# Patient Record
Sex: MALE | Race: ASIAN | NOT HISPANIC OR LATINO | Employment: STUDENT | URBAN - METROPOLITAN AREA
[De-identification: names, ages, dates, MRNs, and addresses within clinical notes are randomized per-mention and may not be internally consistent; named-entity substitution may affect disease eponyms.]

---

## 2019-05-15 ENCOUNTER — OFFICE VISIT (OUTPATIENT)
Dept: FAMILY MEDICINE CLINIC | Facility: CLINIC | Age: 24
End: 2019-05-15
Payer: COMMERCIAL

## 2019-05-15 VITALS
TEMPERATURE: 97.9 F | WEIGHT: 125 LBS | HEART RATE: 80 BPM | RESPIRATION RATE: 16 BRPM | BODY MASS INDEX: 21.34 KG/M2 | DIASTOLIC BLOOD PRESSURE: 70 MMHG | HEIGHT: 64 IN | SYSTOLIC BLOOD PRESSURE: 104 MMHG

## 2019-05-15 DIAGNOSIS — J30.9 ALLERGIC RHINITIS, UNSPECIFIED SEASONALITY, UNSPECIFIED TRIGGER: ICD-10-CM

## 2019-05-15 DIAGNOSIS — Z13.83 SCREENING FOR CARDIOVASCULAR, RESPIRATORY, AND GENITOURINARY DISEASES: ICD-10-CM

## 2019-05-15 DIAGNOSIS — Z13.6 SCREENING FOR CARDIOVASCULAR, RESPIRATORY, AND GENITOURINARY DISEASES: ICD-10-CM

## 2019-05-15 DIAGNOSIS — Z00.00 HEALTHCARE MAINTENANCE: Primary | ICD-10-CM

## 2019-05-15 DIAGNOSIS — Z13.89 SCREENING FOR CARDIOVASCULAR, RESPIRATORY, AND GENITOURINARY DISEASES: ICD-10-CM

## 2019-05-15 DIAGNOSIS — Z13.1 SCREENING FOR DIABETES MELLITUS (DM): ICD-10-CM

## 2019-05-15 PROCEDURE — 99385 PREV VISIT NEW AGE 18-39: CPT | Performed by: FAMILY MEDICINE

## 2019-05-15 RX ORDER — CETIRIZINE HYDROCHLORIDE 10 MG/1
10 TABLET ORAL DAILY PRN
COMMUNITY

## 2019-10-18 ENCOUNTER — OFFICE VISIT (OUTPATIENT)
Dept: FAMILY MEDICINE CLINIC | Facility: CLINIC | Age: 24
End: 2019-10-18
Payer: COMMERCIAL

## 2019-10-18 VITALS
HEART RATE: 76 BPM | WEIGHT: 133 LBS | RESPIRATION RATE: 18 BRPM | DIASTOLIC BLOOD PRESSURE: 70 MMHG | BODY MASS INDEX: 22.71 KG/M2 | SYSTOLIC BLOOD PRESSURE: 110 MMHG | TEMPERATURE: 97.4 F | OXYGEN SATURATION: 98 % | HEIGHT: 64 IN

## 2019-10-18 DIAGNOSIS — F41.8 SITUATIONAL ANXIETY: Primary | ICD-10-CM

## 2019-10-18 PROCEDURE — 3008F BODY MASS INDEX DOCD: CPT | Performed by: FAMILY MEDICINE

## 2019-10-18 PROCEDURE — 99213 OFFICE O/P EST LOW 20 MIN: CPT | Performed by: FAMILY MEDICINE

## 2019-10-18 RX ORDER — ESCITALOPRAM OXALATE 10 MG/1
10 TABLET ORAL DAILY
Qty: 90 TABLET | Refills: 0 | Status: SHIPPED | OUTPATIENT
Start: 2019-10-18 | End: 2019-11-22 | Stop reason: SDUPTHER

## 2019-10-18 NOTE — PROGRESS NOTES
Assessment/Plan:    No problem-specific Assessment & Plan notes found for this encounter  Anxiety new, situational but prolonged  Declines CBT  Denies suicidal/homicidal/destructive ideations  Offered Nikhil if ready  Recheck 1m  etoh caution     Diagnoses and all orders for this visit:    Situational anxiety  -     escitalopram (LEXAPRO) 10 mg tablet; Take 1 tablet (10 mg total) by mouth daily        Return in about 1 month (around 11/18/2019) for Recheck  Subjective:      Patient ID: Dyan Perrin is a 21 y o  male  Chief Complaint   Patient presents with    Anxiety     rmklpn       HPI  No travel infections  Been feeling anxious lately  Lead on a project  Lot of work  Group effort  Anxiety about that  Issue may last for years  Not at job risk  Sleeping well  Gets nausea  Dec appetite  Hard to concentrate  Gets irritable  GF notices  edgey  Not interested in CBT    The following portions of the patient's history were reviewed and updated as appropriate: allergies, current medications, past family history, past medical history, past social history, past surgical history and problem list     Review of Systems   Constitutional: Negative for unexpected weight change  Gastrointestinal: Negative for nausea and vomiting  Psychiatric/Behavioral: Negative for suicidal ideas  The patient is nervous/anxious  Current Outpatient Medications   Medication Sig Dispense Refill    cetirizine (ZyrTEC) 10 mg tablet Take 10 mg by mouth daily      escitalopram (LEXAPRO) 10 mg tablet Take 1 tablet (10 mg total) by mouth daily 90 tablet 0     No current facility-administered medications for this visit  Objective:    /70   Pulse 76   Temp (!) 97 4 °F (36 3 °C)   Resp 18   Ht 5' 3 75" (1 619 m)   Wt 60 3 kg (133 lb)   SpO2 98%   BMI 23 01 kg/m²        Physical Exam   Constitutional: He appears well-developed  No distress  HENT:   Head: Normocephalic     Right Ear: External ear normal  Left Ear: External ear normal    Eyes: Conjunctivae are normal  No scleral icterus  Neck: Neck supple  Cardiovascular: Normal rate, regular rhythm, normal heart sounds and intact distal pulses  No murmur heard  Pulmonary/Chest: Effort normal and breath sounds normal  No respiratory distress  He has no wheezes  Abdominal: Soft  Bowel sounds are normal    Musculoskeletal: He exhibits no edema or deformity  Neurological: He is alert  Skin: Skin is warm and dry  No pallor  Psychiatric: His behavior is normal  Thought content normal    Nursing note and vitals reviewed               Blaise Oliva, DO

## 2019-11-22 ENCOUNTER — OFFICE VISIT (OUTPATIENT)
Dept: FAMILY MEDICINE CLINIC | Facility: CLINIC | Age: 24
End: 2019-11-22
Payer: COMMERCIAL

## 2019-11-22 VITALS
OXYGEN SATURATION: 97 % | RESPIRATION RATE: 16 BRPM | HEIGHT: 64 IN | BODY MASS INDEX: 22.71 KG/M2 | DIASTOLIC BLOOD PRESSURE: 90 MMHG | SYSTOLIC BLOOD PRESSURE: 120 MMHG | WEIGHT: 133 LBS | TEMPERATURE: 98.1 F | HEART RATE: 91 BPM

## 2019-11-22 DIAGNOSIS — F41.8 SITUATIONAL ANXIETY: ICD-10-CM

## 2019-11-22 PROCEDURE — 1036F TOBACCO NON-USER: CPT | Performed by: FAMILY MEDICINE

## 2019-11-22 PROCEDURE — 99213 OFFICE O/P EST LOW 20 MIN: CPT | Performed by: FAMILY MEDICINE

## 2019-11-22 RX ORDER — ESCITALOPRAM OXALATE 10 MG/1
10 TABLET ORAL DAILY
Qty: 90 TABLET | Refills: 1 | Status: SHIPPED | OUTPATIENT
Start: 2019-11-22 | End: 2020-04-27 | Stop reason: SDUPTHER

## 2019-11-22 NOTE — PROGRESS NOTES
Assessment/Plan:    No problem-specific Assessment & Plan notes found for this encounter  Anxiety stable on meds, lexapro 10mg/d  Declines CBT  Cont meds for at least 6m  Could offer increase in future if needed     Diagnoses and all orders for this visit:    Situational anxiety  -     escitalopram (LEXAPRO) 10 mg tablet; Take 1 tablet (10 mg total) by mouth daily        Return in about 6 months (around 5/22/2020) for Recheck  Subjective:      Patient ID: Maite White is a 21 y o  male  Chief Complaint   Patient presents with    Follow-up     1 month [id number]       HPI  lexapro caused insomnia and nausea for 1d  Stress at work same  Less panic  Willing to stay on  No CBT needed  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  Denies suicidal/homicidal/destructive ideations  The following portions of the patient's history were reviewed and updated as appropriate: allergies, current medications, past family history, past medical history, past social history, past surgical history and problem list     Review of Systems   Gastrointestinal: Negative for diarrhea and nausea  Current Outpatient Medications   Medication Sig Dispense Refill    cetirizine (ZyrTEC) 10 mg tablet Take 10 mg by mouth daily      escitalopram (LEXAPRO) 10 mg tablet Take 1 tablet (10 mg total) by mouth daily 90 tablet 1     No current facility-administered medications for this visit  Objective:    /90   Pulse 91   Temp 98 1 °F (36 7 °C)   Resp 16   Ht 5' 3 75" (1 619 m)   Wt 60 3 kg (133 lb)   SpO2 97%   BMI 23 01 kg/m²        Physical Exam   Constitutional: He appears well-developed  No distress  HENT:   Head: Normocephalic  Right Ear: External ear normal    Left Ear: External ear normal    Mouth/Throat: No oropharyngeal exudate  Eyes: Conjunctivae are normal  No scleral icterus  Neck: Neck supple  Cardiovascular: Normal rate, regular rhythm and intact distal pulses  No murmur heard    Pulmonary/Chest: Effort normal and breath sounds normal  No respiratory distress  Abdominal: Soft  Bowel sounds are normal    Musculoskeletal: He exhibits no edema or deformity  Neurological: He is alert  Skin: Skin is warm and dry  No pallor  Psychiatric: His behavior is normal  Thought content normal    Nursing note and vitals reviewed             Linsey Araya DO

## 2020-04-27 DIAGNOSIS — F41.8 SITUATIONAL ANXIETY: ICD-10-CM

## 2020-04-27 RX ORDER — ESCITALOPRAM OXALATE 10 MG/1
10 TABLET ORAL DAILY
Qty: 90 TABLET | Refills: 1 | Status: SHIPPED | OUTPATIENT
Start: 2020-04-27 | End: 2020-05-28 | Stop reason: SDUPTHER

## 2020-05-28 ENCOUNTER — TELEPHONE (OUTPATIENT)
Dept: FAMILY MEDICINE CLINIC | Facility: CLINIC | Age: 25
End: 2020-05-28

## 2020-05-28 ENCOUNTER — OFFICE VISIT (OUTPATIENT)
Dept: FAMILY MEDICINE CLINIC | Facility: CLINIC | Age: 25
End: 2020-05-28
Payer: COMMERCIAL

## 2020-05-28 VITALS
HEART RATE: 76 BPM | RESPIRATION RATE: 16 BRPM | DIASTOLIC BLOOD PRESSURE: 70 MMHG | HEIGHT: 64 IN | TEMPERATURE: 96.8 F | BODY MASS INDEX: 23.56 KG/M2 | WEIGHT: 138 LBS | SYSTOLIC BLOOD PRESSURE: 110 MMHG | OXYGEN SATURATION: 98 %

## 2020-05-28 DIAGNOSIS — Z13.89 SCREENING FOR CARDIOVASCULAR, RESPIRATORY, AND GENITOURINARY DISEASES: ICD-10-CM

## 2020-05-28 DIAGNOSIS — R53.83 OTHER FATIGUE: ICD-10-CM

## 2020-05-28 DIAGNOSIS — F41.8 SITUATIONAL ANXIETY: Primary | ICD-10-CM

## 2020-05-28 DIAGNOSIS — Z13.1 SCREENING FOR DIABETES MELLITUS (DM): ICD-10-CM

## 2020-05-28 DIAGNOSIS — Z23 IMMUNIZATION DUE: ICD-10-CM

## 2020-05-28 DIAGNOSIS — Z13.6 SCREENING FOR CARDIOVASCULAR, RESPIRATORY, AND GENITOURINARY DISEASES: ICD-10-CM

## 2020-05-28 DIAGNOSIS — Z13.83 SCREENING FOR CARDIOVASCULAR, RESPIRATORY, AND GENITOURINARY DISEASES: ICD-10-CM

## 2020-05-28 PROCEDURE — 90715 TDAP VACCINE 7 YRS/> IM: CPT

## 2020-05-28 PROCEDURE — 90471 IMMUNIZATION ADMIN: CPT

## 2020-05-28 PROCEDURE — 99214 OFFICE O/P EST MOD 30 MIN: CPT | Performed by: FAMILY MEDICINE

## 2020-05-28 PROCEDURE — 1036F TOBACCO NON-USER: CPT | Performed by: FAMILY MEDICINE

## 2020-05-28 PROCEDURE — 3008F BODY MASS INDEX DOCD: CPT | Performed by: FAMILY MEDICINE

## 2020-05-28 RX ORDER — ESCITALOPRAM OXALATE 10 MG/1
10 TABLET ORAL DAILY
Qty: 90 TABLET | Refills: 1 | Status: SHIPPED | OUTPATIENT
Start: 2020-05-28 | End: 2021-01-08 | Stop reason: SDUPTHER

## 2020-11-02 LAB
ALBUMIN SERPL-MCNC: 4.8 G/DL (ref 4.1–5.2)
ALBUMIN/GLOB SERPL: 1.8 {RATIO} (ref 1.2–2.2)
ALP SERPL-CCNC: 83 IU/L (ref 39–117)
ALT SERPL-CCNC: 23 IU/L (ref 0–44)
AST SERPL-CCNC: 20 IU/L (ref 0–40)
BASOPHILS # BLD AUTO: 0.1 X10E3/UL (ref 0–0.2)
BASOPHILS NFR BLD AUTO: 1 %
BILIRUB SERPL-MCNC: 0.5 MG/DL (ref 0–1.2)
BUN SERPL-MCNC: 14 MG/DL (ref 6–20)
BUN/CREAT SERPL: 14 (ref 9–20)
CALCIUM SERPL-MCNC: 9.6 MG/DL (ref 8.7–10.2)
CHLORIDE SERPL-SCNC: 105 MMOL/L (ref 96–106)
CHOLEST SERPL-MCNC: 188 MG/DL (ref 100–199)
CO2 SERPL-SCNC: 24 MMOL/L (ref 20–29)
CREAT SERPL-MCNC: 1.03 MG/DL (ref 0.76–1.27)
EOSINOPHIL # BLD AUTO: 0.1 X10E3/UL (ref 0–0.4)
EOSINOPHIL NFR BLD AUTO: 2 %
ERYTHROCYTE [DISTWIDTH] IN BLOOD BY AUTOMATED COUNT: 13.3 % (ref 11.6–15.4)
GLOBULIN SER-MCNC: 2.7 G/DL (ref 1.5–4.5)
GLUCOSE SERPL-MCNC: 87 MG/DL (ref 65–99)
HCT VFR BLD AUTO: 43.4 % (ref 37.5–51)
HDLC SERPL-MCNC: 37 MG/DL
HGB BLD-MCNC: 15.2 G/DL (ref 13–17.7)
IMM GRANULOCYTES # BLD: 0 X10E3/UL (ref 0–0.1)
IMM GRANULOCYTES NFR BLD: 0 %
LDLC SERPL CALC-MCNC: 107 MG/DL (ref 0–99)
LYMPHOCYTES # BLD AUTO: 2.2 X10E3/UL (ref 0.7–3.1)
LYMPHOCYTES NFR BLD AUTO: 47 %
MCH RBC QN AUTO: 30.1 PG (ref 26.6–33)
MCHC RBC AUTO-ENTMCNC: 35 G/DL (ref 31.5–35.7)
MCV RBC AUTO: 86 FL (ref 79–97)
MICRODELETION SYND BLD/T FISH: NORMAL
MONOCYTES # BLD AUTO: 0.3 X10E3/UL (ref 0.1–0.9)
MONOCYTES NFR BLD AUTO: 6 %
NEUTROPHILS # BLD AUTO: 2.1 X10E3/UL (ref 1.4–7)
NEUTROPHILS NFR BLD AUTO: 44 %
PLATELET # BLD AUTO: 376 X10E3/UL (ref 150–450)
POTASSIUM SERPL-SCNC: 4.3 MMOL/L (ref 3.5–5.2)
PROT SERPL-MCNC: 7.5 G/DL (ref 6–8.5)
RBC # BLD AUTO: 5.05 X10E6/UL (ref 4.14–5.8)
SL AMB EGFR AFRICAN AMERICAN: 115 ML/MIN/1.73
SL AMB EGFR NON AFRICAN AMERICAN: 100 ML/MIN/1.73
SODIUM SERPL-SCNC: 142 MMOL/L (ref 134–144)
TRIGL SERPL-MCNC: 254 MG/DL (ref 0–149)
TSH SERPL DL<=0.005 MIU/L-ACNC: 1.28 UIU/ML (ref 0.45–4.5)
WBC # BLD AUTO: 4.7 X10E3/UL (ref 3.4–10.8)

## 2021-01-07 PROBLEM — J06.9 UPPER RESPIRATORY INFECTION, ACUTE: Status: ACTIVE | Noted: 2019-03-09

## 2021-01-08 ENCOUNTER — OFFICE VISIT (OUTPATIENT)
Dept: FAMILY MEDICINE CLINIC | Facility: CLINIC | Age: 26
End: 2021-01-08
Payer: COMMERCIAL

## 2021-01-08 VITALS
BODY MASS INDEX: 25.27 KG/M2 | WEIGHT: 148 LBS | HEIGHT: 64 IN | SYSTOLIC BLOOD PRESSURE: 108 MMHG | DIASTOLIC BLOOD PRESSURE: 62 MMHG | TEMPERATURE: 98.5 F | RESPIRATION RATE: 16 BRPM | HEART RATE: 74 BPM

## 2021-01-08 DIAGNOSIS — Z00.00 HEALTHCARE MAINTENANCE: Primary | ICD-10-CM

## 2021-01-08 DIAGNOSIS — E78.1 HIGH TRIGLYCERIDES: ICD-10-CM

## 2021-01-08 DIAGNOSIS — F41.8 SITUATIONAL ANXIETY: ICD-10-CM

## 2021-01-08 PROBLEM — J06.9 UPPER RESPIRATORY INFECTION, ACUTE: Status: RESOLVED | Noted: 2019-03-09 | Resolved: 2021-01-08

## 2021-01-08 PROCEDURE — 1036F TOBACCO NON-USER: CPT | Performed by: FAMILY MEDICINE

## 2021-01-08 PROCEDURE — 3008F BODY MASS INDEX DOCD: CPT | Performed by: FAMILY MEDICINE

## 2021-01-08 PROCEDURE — 3725F SCREEN DEPRESSION PERFORMED: CPT | Performed by: FAMILY MEDICINE

## 2021-01-08 PROCEDURE — 99395 PREV VISIT EST AGE 18-39: CPT | Performed by: FAMILY MEDICINE

## 2021-01-08 RX ORDER — ESCITALOPRAM OXALATE 10 MG/1
10 TABLET ORAL DAILY
Qty: 90 TABLET | Refills: 1 | Status: SHIPPED | OUTPATIENT
Start: 2021-01-08 | End: 2021-07-16 | Stop reason: SDUPTHER

## 2021-01-08 NOTE — PROGRESS NOTES
Assessment/Plan:    No problem-specific Assessment & Plan notes found for this encounter  cpe  Anxiety stable, cont meds for now, upcoming move and marriage and restart work  High triglycerides, diet advised, recheck in 6m, 254     Diagnoses and all orders for this visit:    Healthcare maintenance    Situational anxiety  -     escitalopram (LEXAPRO) 10 mg tablet; Take 1 tablet (10 mg total) by mouth daily    High triglycerides  -     Lipid Panel with Direct LDL reflex; Future        Return in about 6 months (around 7/8/2021) for Recheck  Subjective:      Patient ID: Shekhar Green is a 31 y o  male  Chief Complaint   Patient presents with    Physical Exam     [facility]       HPI  Engaged  Renting for now  Looking for a home    Not as stressed  Eating healthy  Cardio, 3x/w    Good on lexapro  Tolerates refill   Work is slower for now    More fast food lately  Plans to do better    The following portions of the patient's history were reviewed and updated as appropriate: allergies, current medications, past family history, past medical history, past social history, past surgical history and problem list     Review of Systems   Constitutional: Negative for fever  Respiratory: Negative for shortness of breath  Current Outpatient Medications   Medication Sig Dispense Refill    cetirizine (ZyrTEC) 10 mg tablet Take 10 mg by mouth daily      escitalopram (LEXAPRO) 10 mg tablet Take 1 tablet (10 mg total) by mouth daily 90 tablet 1     No current facility-administered medications for this visit  Objective:    /62   Pulse 74   Temp 98 5 °F (36 9 °C)   Resp 16   Ht 5' 4" (1 626 m)   Wt 67 1 kg (148 lb)   BMI 25 40 kg/m²        Physical Exam  Vitals signs and nursing note reviewed  Constitutional:       Appearance: Normal appearance  He is well-developed  He is not ill-appearing  HENT:      Head: Normocephalic        Right Ear: Tympanic membrane, ear canal and external ear normal       Left Ear: Tympanic membrane, ear canal and external ear normal    Eyes:      General: No scleral icterus  Conjunctiva/sclera: Conjunctivae normal    Neck:      Musculoskeletal: Neck supple  Cardiovascular:      Rate and Rhythm: Normal rate and regular rhythm  Heart sounds: No murmur  Pulmonary:      Effort: Pulmonary effort is normal  No respiratory distress  Breath sounds: No wheezing  Abdominal:      General: There is no distension  Palpations: Abdomen is soft  Tenderness: There is no abdominal tenderness  Hernia: No hernia is present  Genitourinary:     Penis: Normal        Scrotum/Testes: Normal    Musculoskeletal:         General: No deformity  Right lower leg: No edema  Left lower leg: No edema  Skin:     General: Skin is warm and dry  Coloration: Skin is not jaundiced or pale  Neurological:      General: No focal deficit present  Mental Status: He is alert  Psychiatric:         Mood and Affect: Mood normal          Behavior: Behavior normal          Thought Content: Thought content normal        BMI Counseling: Body mass index is 25 4 kg/m²  The BMI is above normal  Nutrition recommendations include decreasing portion sizes and moderation in carbohydrate intake  Exercise recommendations include exercising 3-5 times per week  No pharmacotherapy was ordered                  Raf Starr DO

## 2021-07-16 ENCOUNTER — OFFICE VISIT (OUTPATIENT)
Dept: FAMILY MEDICINE CLINIC | Facility: CLINIC | Age: 26
End: 2021-07-16
Payer: COMMERCIAL

## 2021-07-16 VITALS
RESPIRATION RATE: 16 BRPM | WEIGHT: 156 LBS | HEIGHT: 64 IN | SYSTOLIC BLOOD PRESSURE: 120 MMHG | BODY MASS INDEX: 26.63 KG/M2 | TEMPERATURE: 96.9 F | DIASTOLIC BLOOD PRESSURE: 70 MMHG | HEART RATE: 64 BPM

## 2021-07-16 DIAGNOSIS — F41.8 SITUATIONAL ANXIETY: Primary | ICD-10-CM

## 2021-07-16 DIAGNOSIS — Z13.1 SCREENING FOR DIABETES MELLITUS (DM): ICD-10-CM

## 2021-07-16 DIAGNOSIS — Z13.6 SCREENING FOR CARDIOVASCULAR, RESPIRATORY, AND GENITOURINARY DISEASES: ICD-10-CM

## 2021-07-16 DIAGNOSIS — Z13.83 SCREENING FOR CARDIOVASCULAR, RESPIRATORY, AND GENITOURINARY DISEASES: ICD-10-CM

## 2021-07-16 DIAGNOSIS — Z13.89 SCREENING FOR CARDIOVASCULAR, RESPIRATORY, AND GENITOURINARY DISEASES: ICD-10-CM

## 2021-07-16 PROBLEM — R53.83 OTHER FATIGUE: Status: RESOLVED | Noted: 2020-05-28 | Resolved: 2021-07-16

## 2021-07-16 PROCEDURE — 99213 OFFICE O/P EST LOW 20 MIN: CPT | Performed by: FAMILY MEDICINE

## 2021-07-16 RX ORDER — ESCITALOPRAM OXALATE 10 MG/1
10 TABLET ORAL DAILY
Qty: 90 TABLET | Refills: 1 | Status: SHIPPED | OUTPATIENT
Start: 2021-07-16 | End: 2021-12-17

## 2021-07-16 NOTE — PROGRESS NOTES
Assessment/Plan:    No problem-specific Assessment & Plan notes found for this encounter  Anxiety stable on lexapro, continue same       Diagnoses and all orders for this visit:    Situational anxiety  -     escitalopram (LEXAPRO) 10 mg tablet; Take 1 tablet (10 mg total) by mouth daily    Screening for cardiovascular, respiratory, and genitourinary diseases  -     Lipid Panel with Direct LDL reflex; Future    Screening for diabetes mellitus (DM)  -     Comprehensive metabolic panel; Future    BMI 26 0-26 9,adult              Return in about 6 months (around 1/10/2022) for Annual physical     Subjective:      Patient ID: Kelly Lara is a 31 y o  male  Chief Complaint   Patient presents with    Follow-up     medication ac/lpn       HPI  Engaged, getting  next year  Work is good  On site work, not remote  Works in [place], [place]    anxiety better  Still has stress  Manageable  7# wt gain  Clothes are tighter    The following portions of the patient's history were reviewed and updated as appropriate: allergies, current medications, past family history, past medical history, past social history, past surgical history and problem list     Review of Systems   Constitutional: Negative for fever  Respiratory: Negative for shortness of breath  Current Outpatient Medications   Medication Sig Dispense Refill    cetirizine (ZyrTEC) 10 mg tablet Take 10 mg by mouth daily as needed       escitalopram (LEXAPRO) 10 mg tablet Take 1 tablet (10 mg total) by mouth daily 90 tablet 1     No current facility-administered medications for this visit  Objective:    /70   Pulse 64   Temp (!) 96 9 °F (36 1 °C)   Resp 16   Ht 5' 4" (1 626 m)   Wt 70 8 kg (156 lb)   BMI 26 78 kg/m²        Physical Exam  Vitals and nursing note reviewed  Constitutional:       Appearance: He is well-developed  He is not ill-appearing  HENT:      Head: Normocephalic        Right Ear: Tympanic membrane normal  Left Ear: Tympanic membrane normal    Eyes:      General: No scleral icterus  Conjunctiva/sclera: Conjunctivae normal    Cardiovascular:      Rate and Rhythm: Normal rate and regular rhythm  Heart sounds: No murmur heard  Pulmonary:      Effort: Pulmonary effort is normal  No respiratory distress  Breath sounds: No wheezing  Abdominal:      Palpations: Abdomen is soft  Musculoskeletal:         General: No deformity  Cervical back: Neck supple  Skin:     General: Skin is warm and dry  Coloration: Skin is not pale  Neurological:      Mental Status: He is alert  Motor: No weakness  Gait: Gait normal    Psychiatric:         Mood and Affect: Mood normal          Behavior: Behavior normal          Thought Content: Thought content normal        BMI Counseling: Body mass index is 26 78 kg/m²  The BMI is above normal  Nutrition recommendations include decreasing portion sizes and moderation in carbohydrate intake  Exercise recommendations include exercising 3-5 times per week  No pharmacotherapy was ordered                  Sol Reynolds DO

## 2021-12-17 DIAGNOSIS — F41.8 SITUATIONAL ANXIETY: ICD-10-CM

## 2021-12-17 RX ORDER — ESCITALOPRAM OXALATE 10 MG/1
TABLET ORAL
Qty: 90 TABLET | Refills: 1 | Status: SHIPPED | OUTPATIENT
Start: 2021-12-17

## 2022-10-12 PROBLEM — J06.9 UPPER RESPIRATORY INFECTION, ACUTE: Status: RESOLVED | Noted: 2019-03-09 | Resolved: 2022-10-12

## 2022-11-16 ENCOUNTER — OFFICE VISIT (OUTPATIENT)
Dept: URGENT CARE | Facility: CLINIC | Age: 27
End: 2022-11-16

## 2022-11-16 ENCOUNTER — APPOINTMENT (OUTPATIENT)
Dept: RADIOLOGY | Facility: CLINIC | Age: 27
End: 2022-11-16

## 2022-11-16 VITALS — TEMPERATURE: 98 F | RESPIRATION RATE: 14 BRPM | OXYGEN SATURATION: 100 % | HEART RATE: 76 BPM

## 2022-11-16 DIAGNOSIS — M79.672 LEFT FOOT PAIN: ICD-10-CM

## 2022-11-16 DIAGNOSIS — M79.672 LEFT FOOT PAIN: Primary | ICD-10-CM

## 2022-11-16 RX ORDER — PREDNISONE 10 MG/1
40 TABLET ORAL DAILY
Qty: 16 TABLET | Refills: 0 | Status: SHIPPED | OUTPATIENT
Start: 2022-11-16 | End: 2022-11-20

## 2022-11-16 NOTE — PROGRESS NOTES
[facility]        NAME: Markie Titus is a 28 y o  male  : 1995    MRN: [id number]  DATE: 2022  TIME: 11:31 AM    Assessment and Plan   Left foot pain [M79 [id number]]  1  Left foot pain  XR foot 3+ vw left    Ambulatory referral to Orthopedic Surgery    predniSONE 10 mg tablet            Patient Instructions   Patient Instructions   Xray appears negative for any fracture  Will follow up with radiologist report when available  Recommend elevating body part, icing the area every 2 hours for 20-30 minutes, take Ibuprofen every 6-8 hours to reduce inflammation  If not improving over the next week, follow up with PCP or orthopedics  Try a supportive shoe and take the steroid to help with inflammation         Follow up with PCP in 3-5 days  Proceed to  ER if symptoms worsen  Chief Complaint     Chief Complaint   Patient presents with   • Pain     Pt presents with pain of the left foot lateral/plantar site of the foot of unknown etiology         History of Present Illness       The pt is a 30-year-old male presenting for atraumatic pain of his left foot on the lateral side  Denies prior episodes that lasted this long or injury  The area is swollen and red  The pain began under his 5th toe and spreads to the lateral ankle  He reports pain with walking  This has happened ocne before but improved right away  He does report starting to wear ugg slippers recently  Review of Systems   Review of Systems   Musculoskeletal: Positive for arthralgias  Negative for gait problem, joint swelling and myalgias  Skin: Negative for color change           Current Medications       Current Outpatient Medications:   •  cetirizine (ZyrTEC) 10 mg tablet, Take 10 mg by mouth daily as needed , Disp: , Rfl:   •  escitalopram (LEXAPRO) 10 mg tablet, TAKE 1 TABLET DAILY, Disp: 90 tablet, Rfl: 1  •  predniSONE 10 mg tablet, Take 4 tablets (40 mg total) by mouth daily for 4 days, Disp: 16 tablet, Rfl: 0    Current Allergies     Allergies as of 11/16/2022 - Reviewed 11/16/2022   Allergen Reaction Noted   • Other  05/15/2019            The following portions of the patient's history were reviewed and updated as appropriate: allergies, current medications, past family history, past medical history, past social history, past surgical history and problem list      Past Medical History:   Diagnosis Date   • Allergic    • Anxiety        History reviewed  No pertinent surgical history  Family History   Problem Relation Age of Onset   • Hyperlipidemia Mother    • Diabetes Father          Medications have been verified  Objective   Pulse 76   Temp 98 °F (36 7 °C)   Resp 14   SpO2 100%        Physical Exam     Physical Exam  Vitals and nursing note reviewed  Constitutional:       General: He is not in acute distress  Appearance: Normal appearance  He is normal weight  He is not ill-appearing, toxic-appearing or diaphoretic  HENT:      Head: Normocephalic and atraumatic  Cardiovascular:      Rate and Rhythm: Normal rate and regular rhythm  Heart sounds: Normal heart sounds  No murmur heard  No friction rub  No gallop  Pulmonary:      Effort: Pulmonary effort is normal  No respiratory distress  Breath sounds: Normal breath sounds  No stridor  No wheezing, rhonchi or rales  Chest:      Chest wall: No tenderness  Musculoskeletal:         General: Tenderness present  No swelling  Normal range of motion  Cervical back: Normal range of motion  Comments: Tenderness to palpation of the lateral foot     Lymphadenopathy:      Cervical: No cervical adenopathy  Skin:     General: Skin is warm and dry  Capillary Refill: Capillary refill takes less than 2 seconds  Neurological:      Mental Status: He is alert

## 2022-11-16 NOTE — PATIENT INSTRUCTIONS
Xray appears negative for any fracture  Will follow up with radiologist report when available  Recommend elevating body part, icing the area every 2 hours for 20-30 minutes, take Ibuprofen every 6-8 hours to reduce inflammation  If not improving over the next week, follow up with PCP or orthopedics          Try a supportive shoe and take the steroid to help with inflammation

## 2022-11-28 VITALS
HEART RATE: 76 BPM | HEIGHT: 64 IN | BODY MASS INDEX: 26.63 KG/M2 | DIASTOLIC BLOOD PRESSURE: 70 MMHG | SYSTOLIC BLOOD PRESSURE: 120 MMHG | WEIGHT: 156 LBS

## 2022-11-28 DIAGNOSIS — M79.672 LEFT FOOT PAIN: ICD-10-CM

## 2022-11-28 NOTE — PROGRESS NOTES
Assessment/Plan:  1  Left foot pain  Ambulatory referral to [address] [place] [address] has left-sided foot pain which is very minimal on today's exam   He has only minimal discomfort over the cuboid laterally  There is no clear finding of a reason why he was having such severe pain and swelling  I did discuss possibility of a gouty flare or tendinitis that has calmed down  In any event he seems to be doing well today and I recommended gradually increasing his activity once the pain is fully resolved  If the pain returns or swelling persists then we could consider further imaging with an MRI if necessary  He will follow up with me only if needed  Subjective:   Pepe Hawkins is a 28 y o  male who presents to the evaluation left-sided pain  He states he had a sudden onset left-sided foot pain about 2 weeks ago  The pain was sharp and stabbing the dorsum of left foot  Has swelling at that time  Went to urgent care and negative x-rays placed steroids  The steroids seem calm down the pain and swelling significantly  He only has minimal discomfort today  Has no significant pain walking and no current swelling redness  Did do some running and training for a 5K over summer but is not currently running  Like to get back in exercise bone want to make sure he okay  He has no history any foot swelling or foot injury in the past   He any trauma      Review of Systems   Constitutional: Negative for chills, fever and unexpected weight change  HENT: Negative for hearing loss, nosebleeds and sore throat  Eyes: Negative for pain, redness and visual disturbance  Respiratory: Negative for cough, shortness of breath and wheezing  Cardiovascular: Negative for chest pain, palpitations and leg swelling  Gastrointestinal: Negative for abdominal pain, nausea and vomiting  Endocrine: Negative for polyphagia and polyuria  Genitourinary: Negative for dysuria and hematuria     Musculoskeletal: See HPI   Skin: Negative for rash and wound  Neurological: Negative for dizziness, numbness and headaches  Psychiatric/Behavioral: Negative for decreased concentration and suicidal ideas  The patient is not nervous/anxious  Past Medical History:   Diagnosis Date   • Allergic    • Anxiety        No past surgical history on file  Family History   Problem Relation Age of Onset   • Hyperlipidemia Mother    • Diabetes Father        Social History     Occupational History   • Not on file   Tobacco Use   • Smoking status: Never   • Smokeless tobacco: Never   Vaping Use   • Vaping Use: Never used   Substance and Sexual Activity   • Alcohol use: Yes     Alcohol/week: 1 0 standard drink     Types: 1 Cans of beer per week   • Drug use: Never   • Sexual activity: Yes         Current Outpatient Medications:   •  cetirizine (ZyrTEC) 10 mg tablet, Take 10 mg by mouth daily as needed , Disp: , Rfl:   •  escitalopram (LEXAPRO) 10 mg tablet, TAKE 1 TABLET DAILY, Disp: 90 tablet, Rfl: 1    Allergies   Allergen Reactions   • Other      Seasonal allergies       Objective:  Vitals:    11/28/22 1254   BP: 120/70   Pulse: 76       Left Ankle Exam   Left ankle exam is normal     Tenderness   The patient is experiencing no tenderness  Range of Motion   Dorsiflexion: normal   Plantar flexion: normal   Eversion: normal   Inversion: normal     Other   Erythema: absent  Sensation: normal  Pulse: present            Physical Exam  Vitals reviewed  Constitutional:       Appearance: He is well-developed and well-nourished  HENT:      Head: Normocephalic and atraumatic  Eyes:      Conjunctiva/sclera: Conjunctivae normal       Pupils: Pupils are equal, round, and reactive to light  Cardiovascular:      Rate and Rhythm: Normal rate  Pulses: Intact distal pulses  Pulmonary:      Effort: Pulmonary effort is normal  No respiratory distress  Musculoskeletal:      Cervical back: Normal range of motion and neck supple  Comments: As noted in HPI   Feet:      Comments: Mild discomfort to palpation over base of fourth metatarsal and cuboid  Skin:     General: Skin is warm and dry  Neurological:      Mental Status: He is alert and oriented to person, place, and time  Psychiatric:         Mood and Affect: Mood and affect normal          Behavior: Behavior normal          I have personally reviewed pertinent films in PACS and my interpretation is as follows:  X-ray of the left foot demonstrates no evidence of acute fracture  This document was created using speech voice recognition software  Grammatical errors, random word insertions, pronoun errors, and incomplete sentences are an occasional consequence of this system due to software limitations, ambient noise, and hardware issues  Any formal questions or concerns about content, text, or information contained within the body of this dictation should be directly addressed to the provider for clarification

## 2022-12-09 ENCOUNTER — OFFICE VISIT (OUTPATIENT)
Dept: FAMILY MEDICINE CLINIC | Facility: CLINIC | Age: 27
End: 2022-12-09

## 2022-12-09 VITALS
BODY MASS INDEX: 28.34 KG/M2 | WEIGHT: 166 LBS | TEMPERATURE: 98 F | DIASTOLIC BLOOD PRESSURE: 84 MMHG | HEART RATE: 97 BPM | RESPIRATION RATE: 18 BRPM | SYSTOLIC BLOOD PRESSURE: 126 MMHG | HEIGHT: 64 IN | OXYGEN SATURATION: 97 %

## 2022-12-09 DIAGNOSIS — Z00.00 HEALTHCARE MAINTENANCE: Primary | ICD-10-CM

## 2022-12-09 DIAGNOSIS — Z13.1 SCREENING FOR DIABETES MELLITUS (DM): ICD-10-CM

## 2022-12-09 DIAGNOSIS — F41.8 SITUATIONAL ANXIETY: ICD-10-CM

## 2022-12-09 DIAGNOSIS — Z13.89 SCREENING FOR CARDIOVASCULAR, RESPIRATORY, AND GENITOURINARY DISEASES: ICD-10-CM

## 2022-12-09 DIAGNOSIS — Z13.83 SCREENING FOR CARDIOVASCULAR, RESPIRATORY, AND GENITOURINARY DISEASES: ICD-10-CM

## 2022-12-09 DIAGNOSIS — Z13.6 SCREENING FOR CARDIOVASCULAR, RESPIRATORY, AND GENITOURINARY DISEASES: ICD-10-CM

## 2022-12-09 RX ORDER — ESCITALOPRAM OXALATE 10 MG/1
10 TABLET ORAL DAILY
Qty: 90 TABLET | Refills: 1 | Status: SHIPPED | OUTPATIENT
Start: 2022-12-09

## 2022-12-09 NOTE — PROGRESS NOTES
Assessment/Plan:    No problem-specific Assessment & Plan notes found for this encounter  cpe  Anxiety stable on lexapro  Fertility concerns     Diagnoses and all orders for this visit:    Healthcare maintenance    Situational anxiety  -     escitalopram (LEXAPRO) 10 mg tablet; Take 1 tablet (10 mg total) by mouth daily    Screening for cardiovascular, respiratory, and genitourinary diseases  -     Lipid Panel with Direct LDL reflex; Future    Screening for diabetes mellitus (DM)  -     Comprehensive metabolic panel; Future    BMI 28 0-28 9,adult        He can let me know if he wants an order for formal sperm analysis or urlogoist or fertility specialist referral      Return in about 6 months (around 6/9/2023) for [address]  Subjective:      Patient ID: Kristina Diaz is a 28 y o  male  Chief Complaint   Patient presents with   • Physical Exam     Physical exam  Ladonna Maldonado exercising  Did do a marathon 5K    Left foot swelling episode  2 episodes   Better inbetween  Used steroids    Gained 20# mostly from eating but exercising less  Had sperm analysis home kit   Has been trying to conceive for a few months    The following portions of the patient's history were reviewed and updated as appropriate: allergies, current medications, past family history, past medical history, past social history, past surgical history and problem list     Review of Systems   Constitutional: Positive for unexpected weight change  Negative for fever  Respiratory: Negative for shortness of breath  Current Outpatient Medications   Medication Sig Dispense Refill   • cetirizine (ZyrTEC) 10 mg tablet Take 10 mg by mouth daily as needed      • escitalopram (LEXAPRO) 10 mg tablet Take 1 tablet (10 mg total) by mouth daily 90 tablet 1     No current facility-administered medications for this visit         Objective:    /84   Pulse 97   Temp 98 °F (36 7 °C)   Resp 18   Ht 5' 4 25" (1 632 m)   Wt 75 3 kg (166 lb)   SpO2 97%   BMI 28 27 kg/m²        Physical Exam  Vitals and nursing note reviewed  Constitutional:       General: He is not in acute distress  Appearance: He is well-developed  He is not ill-appearing  HENT:      Head: Normocephalic  Right Ear: Tympanic membrane and ear canal normal       Left Ear: Tympanic membrane and ear canal normal    Eyes:      General: No scleral icterus  Conjunctiva/sclera: Conjunctivae normal    Cardiovascular:      Rate and Rhythm: Normal rate and regular rhythm  Heart sounds: No murmur heard  Pulmonary:      Effort: Pulmonary effort is normal  No respiratory distress  Breath sounds: No wheezing  Abdominal:      General: There is no distension  Palpations: Abdomen is soft  There is no mass  Hernia: No hernia is present  Genitourinary:     Penis: Normal        Testes: Normal    Musculoskeletal:         General: No deformity  Cervical back: Neck supple  Right lower leg: No edema  Left lower leg: No edema  Skin:     General: Skin is warm and dry  Coloration: Skin is not jaundiced or pale  Neurological:      Mental Status: He is alert  Motor: No weakness  Gait: Gait normal    Psychiatric:         Mood and Affect: Mood normal          Behavior: Behavior normal          Thought Content:  Thought content normal                 Apolinar Harper,

## 2022-12-24 LAB
ALBUMIN SERPL-MCNC: 4.7 G/DL (ref 4.1–5.2)
ALBUMIN/GLOB SERPL: 1.7 {RATIO} (ref 1.2–2.2)
ALP SERPL-CCNC: 102 IU/L (ref 44–121)
ALT SERPL-CCNC: 39 IU/L (ref 0–44)
AST SERPL-CCNC: 26 IU/L (ref 0–40)
BILIRUB SERPL-MCNC: 0.3 MG/DL (ref 0–1.2)
BUN SERPL-MCNC: 11 MG/DL (ref 6–20)
BUN/CREAT SERPL: 9 (ref 9–20)
CALCIUM SERPL-MCNC: 9.9 MG/DL (ref 8.7–10.2)
CHLORIDE SERPL-SCNC: 101 MMOL/L (ref 96–106)
CHOLEST SERPL-MCNC: 195 MG/DL (ref 100–199)
CO2 SERPL-SCNC: 26 MMOL/L (ref 20–29)
CREAT SERPL-MCNC: 1.22 MG/DL (ref 0.76–1.27)
EGFR: 83 ML/MIN/1.73
GLOBULIN SER-MCNC: 2.8 G/DL (ref 1.5–4.5)
GLUCOSE SERPL-MCNC: 90 MG/DL (ref 70–99)
HDLC SERPL-MCNC: 32 MG/DL
LDLC SERPL CALC-MCNC: 98 MG/DL (ref 0–99)
MICRODELETION SYND BLD/T FISH: NORMAL
POTASSIUM SERPL-SCNC: 4.6 MMOL/L (ref 3.5–5.2)
PROT SERPL-MCNC: 7.5 G/DL (ref 6–8.5)
SODIUM SERPL-SCNC: 140 MMOL/L (ref 134–144)
TRIGL SERPL-MCNC: 385 MG/DL (ref 0–149)

## 2023-03-21 ENCOUNTER — TELEPHONE (OUTPATIENT)
Dept: OBGYN CLINIC | Facility: HOSPITAL | Age: 28
End: 2023-03-21

## 2023-03-22 ENCOUNTER — OFFICE VISIT (OUTPATIENT)
Dept: FAMILY MEDICINE CLINIC | Facility: CLINIC | Age: 28
End: 2023-03-22

## 2023-03-22 VITALS
HEIGHT: 64 IN | HEART RATE: 90 BPM | WEIGHT: 172.6 LBS | BODY MASS INDEX: 29.47 KG/M2 | DIASTOLIC BLOOD PRESSURE: 82 MMHG | RESPIRATION RATE: 16 BRPM | SYSTOLIC BLOOD PRESSURE: 128 MMHG | TEMPERATURE: 97.8 F

## 2023-03-22 DIAGNOSIS — J31.0 CHRONIC RHINITIS: ICD-10-CM

## 2023-03-22 DIAGNOSIS — F41.8 SITUATIONAL ANXIETY: ICD-10-CM

## 2023-03-22 DIAGNOSIS — M79.671 FOOT PAIN, RIGHT: Primary | ICD-10-CM

## 2023-03-22 RX ORDER — INDOMETHACIN 25 MG/1
CAPSULE ORAL
Qty: 60 CAPSULE | Refills: 1 | Status: SHIPPED | OUTPATIENT
Start: 2023-03-22

## 2023-03-22 NOTE — PROGRESS NOTES
Assessment/Plan:    No problem-specific Assessment & Plan notes found for this encounter  Suspect gout, right forefoot  Check uric acid asap  Indocin prn    Consider podiatry referral if possible pseudogout    Anxiety stable on lexapro, continue    Notable weight gain, encouraged more diet/exercise efforts     Diagnoses and all orders for this visit:    Foot pain, right  -     indomethacin (INDOCIN) 25 mg capsule; 1-2 caps po tid prn gout attacks  -     Uric acid; Future    Situational anxiety    Chronic rhinitis    BMI 29 0-29 9,adult        Return if symptoms worsen or fail to improve  Subjective:      Patient ID: Kristina Diaz is a 33 y o  male  Chief Complaint   Patient presents with   • Foot Pain     Right foot pain, possible gout nm lpn       HPI  Been to Dr Nilay Medina in past for left foot, eventually better after 1 week    Now right foot  Ate steak 2d ago  Next day right foot pain  Swelling  Had to go home from work due to the discomfort  Elevated, iced  Aleve used  No injury or overuse  Drinks water    No fam of gout per pt    The following portions of the patient's history were reviewed and updated as appropriate: allergies, current medications, past family history, past medical history, past social history, past surgical history and problem list     Review of Systems   Constitutional: Negative for chills and fever  Skin: Negative for color change and rash  Current Outpatient Medications   Medication Sig Dispense Refill   • cetirizine (ZyrTEC) 10 mg tablet Take 10 mg by mouth daily as needed      • escitalopram (LEXAPRO) 10 mg tablet Take 1 tablet (10 mg total) by mouth daily 90 tablet 1   • indomethacin (INDOCIN) 25 mg capsule 1-2 caps po tid prn gout attacks 60 capsule 1     No current facility-administered medications for this visit         Objective:    /82   Pulse 90   Temp 97 8 °F (36 6 °C)   Resp 16   Ht 5' 4 25" (1 632 m)   Wt 78 3 kg (172 lb 9 6 oz)   BMI 29 40 kg/m² Physical Exam  Vitals and nursing note reviewed  Constitutional:       General: He is not in acute distress  Appearance: He is well-developed  He is not ill-appearing  HENT:      Head: Normocephalic  Right Ear: Tympanic membrane normal       Left Ear: Tympanic membrane normal    Eyes:      General: No scleral icterus  Conjunctiva/sclera: Conjunctivae normal    Cardiovascular:      Rate and Rhythm: Normal rate and regular rhythm  Pulmonary:      Effort: Pulmonary effort is normal  No respiratory distress  Breath sounds: No wheezing  Abdominal:      Palpations: Abdomen is soft  Musculoskeletal:         General: Swelling and tenderness present  No deformity  Cervical back: Neck supple  Right lower leg: No edema  Left lower leg: No edema  Comments: Right forefoot pain  Mild swelling  No redness  No skin breakdown   Skin:     General: Skin is warm and dry  Coloration: Skin is not pale  Neurological:      Mental Status: He is alert  Gait: Gait abnormal    Psychiatric:         Mood and Affect: Mood normal          Behavior: Behavior normal          Thought Content:  Thought content normal                 Nakul Ryder DO

## 2023-03-23 LAB — URATE SERPL-MCNC: 8.9 MG/DL (ref 3.8–8.4)

## 2023-05-24 DIAGNOSIS — Z31.69 PRE-CONCEPTION COUNSELING: ICD-10-CM

## 2023-05-24 DIAGNOSIS — M10.00 IDIOPATHIC GOUT, UNSPECIFIED CHRONICITY, UNSPECIFIED SITE: Primary | ICD-10-CM

## 2023-06-05 DIAGNOSIS — F41.8 SITUATIONAL ANXIETY: ICD-10-CM

## 2023-06-05 RX ORDER — ESCITALOPRAM OXALATE 10 MG/1
10 TABLET ORAL DAILY
Qty: 90 TABLET | Refills: 1 | Status: SHIPPED | OUTPATIENT
Start: 2023-06-05

## 2023-06-07 ENCOUNTER — APPOINTMENT (OUTPATIENT)
Dept: LAB | Facility: CLINIC | Age: 28
End: 2023-06-07
Payer: COMMERCIAL

## 2023-06-07 DIAGNOSIS — Z31.69 PRE-CONCEPTION COUNSELING: ICD-10-CM

## 2023-06-07 LAB
B ABORTUS AB SMN QL AGGL: SLIGHT
COLLECTION DATE & TIME: ABNORMAL
LIQUEFACTION TIME SMN: ABNORMAL MIN
PH SMN: 8.7 [PH] (ref 7.2–8.6)
SEX ABSTIN DURATION TIME PATIENT: 5 D
SPECIMEN VOL SMN: 0.5 ML (ref 1–5)
SPERM # SMN: 24 MILLION/EJACULATION (ref 40–1000)
SPERM AMORPHOUS HEAD NFR SMN: 20 %
SPERM MORPH PNL SMN: 14
SPERM MOTILE SMN QL MICRO: 70 %
SPERM MOTILITY SCORE SMN AUTO: 4 (ref 2–4)
SPERM PROG NFR SMN: 4 % (ref 2–4)
SPERM SMN: 0 %
SPERM SMN: 0 %
SPERM SMN: 1 %
SPERM SMN: 1 %
SPERM SMN: 2 %
SPERM SMN: 3 %
SPERM SMN: 44 % (ref 0–50)
SPERM SMN: 48 /ML (ref 20–999)
SPERM SMN: 5 %
SPERM SMN: 56 % (ref 50–100)
SPERM SMN: 8 %
VISC SMN: 4 CP (ref 3–4)
WBC SMN QL: 0 "HPF"

## 2023-06-07 PROCEDURE — 89320 SEMEN ANAL VOL/COUNT/MOT: CPT

## 2023-11-24 DIAGNOSIS — F41.8 SITUATIONAL ANXIETY: ICD-10-CM

## 2023-11-24 RX ORDER — ESCITALOPRAM OXALATE 10 MG/1
10 TABLET ORAL DAILY
Qty: 90 TABLET | Refills: 1 | Status: SHIPPED | OUTPATIENT
Start: 2023-11-24

## 2023-12-23 ENCOUNTER — OFFICE VISIT (OUTPATIENT)
Dept: URGENT CARE | Facility: CLINIC | Age: 28
End: 2023-12-23
Payer: COMMERCIAL

## 2023-12-23 VITALS
WEIGHT: 184 LBS | OXYGEN SATURATION: 98 % | HEIGHT: 65 IN | HEART RATE: 82 BPM | BODY MASS INDEX: 30.66 KG/M2 | TEMPERATURE: 97.2 F | RESPIRATION RATE: 20 BRPM

## 2023-12-23 DIAGNOSIS — J06.9 ACUTE URI: Primary | ICD-10-CM

## 2023-12-23 PROCEDURE — 87636 SARSCOV2 & INF A&B AMP PRB: CPT | Performed by: PHYSICIAN ASSISTANT

## 2023-12-23 PROCEDURE — 99213 OFFICE O/P EST LOW 20 MIN: CPT | Performed by: PHYSICIAN ASSISTANT

## 2023-12-23 RX ORDER — DEXTROMETHORPHAN HYDROBROMIDE AND PROMETHAZINE HYDROCHLORIDE 15; 6.25 MG/5ML; MG/5ML
5 SYRUP ORAL 4 TIMES DAILY PRN
Qty: 118 ML | Refills: 0 | Status: SHIPPED | OUTPATIENT
Start: 2023-12-23

## 2023-12-23 NOTE — PATIENT INSTRUCTIONS
1. Over-the-counter ibuprofen and/or acetaminophen as needed for pain or fever.  2. Oxymetazoline nasal spray 2 sprays in each nostril every 12 hours for no more than the next 5 days as needed for nasal congestion.  3. Over-the-counter guaifenesin as needed for mucus relief.  4. Gargle salt water as needed for sore throat relief.  5. Increase oral fluid consumption.  6. Follow-up with primary care provider in 7 days for any persistent symptoms.

## 2023-12-23 NOTE — PROGRESS NOTES
[facility]        NAME: Naldo Stiles is a 28 y.o. male  : 1995    MRN: [id number]  DATE: 2023  TIME: 8:53 AM    Assessment and Plan   Acute URI [J06.9]  1. Acute URI  Covid/Flu-Office Collect    promethazine-dextromethorphan (PHENERGAN-DM) 6.25-15 mg/5 mL oral syrup            Patient Instructions   1. Over-the-counter ibuprofen and/or acetaminophen as needed for pain or fever.  2. Oxymetazoline nasal spray 2 sprays in each nostril every 12 hours for no more than the next 5 days as needed for nasal congestion.  3. Over-the-counter guaifenesin as needed for mucus relief.  4. Gargle salt water as needed for sore throat relief.  5. Increase oral fluid consumption.  6. Follow-up with primary care provider in 7 days for any persistent symptoms.        Chief Complaint     Chief Complaint   Patient presents with    Cold Like Symptoms     Cold symptoms, congestion, sore throat, fever, cough began Wednesday         History of Present Illness       28-year-old male patient with a 4-day history of nasal congestion, sore throat, runny nose, cough, fatigue, intermittent low-grade fevers, chills.  Patient denies any chest pain or shortness of breath.  No GI symptoms.  Denies any body aches.  Patient's wife is pregnant and is concerned about exposures.  No home testing done.        Review of Systems   Review of Systems   Constitutional:  Positive for chills, fatigue and fever.   HENT:  Positive for congestion, rhinorrhea and sore throat. Negative for facial swelling and sinus pain.    Respiratory:  Positive for cough.    Cardiovascular:  Negative for chest pain.   Gastrointestinal:  Negative for abdominal pain.   Musculoskeletal:  Negative for myalgias.   Skin:  Negative for rash.         Current Medications       Current Outpatient Medications:     cetirizine (ZyrTEC) 10 mg tablet, Take 10 mg by mouth daily as needed , Disp: , Rfl:     escitalopram (LEXAPRO) 10 mg tablet, take 1 tablet by  "mouth once daily, Disp: 90 tablet, Rfl: 1    promethazine-dextromethorphan (PHENERGAN-DM) 6.25-15 mg/5 mL oral syrup, Take 5 mL by mouth 4 (four) times a day as needed for cough, Disp: 118 mL, Rfl: 0    indomethacin (INDOCIN) 25 mg capsule, 1-2 caps po tid prn gout attacks (Patient not taking: Reported on 12/23/2023), Disp: 60 capsule, Rfl: 1    Current Allergies     Allergies as of 12/23/2023 - Reviewed 12/23/2023   Allergen Reaction Noted    Other  05/15/2019            The following portions of the patient's history were reviewed and updated as appropriate: allergies, current medications, past family history, past medical history, past social history, past surgical history and problem list.     Past Medical History:   Diagnosis Date    Allergic     Anxiety        No past surgical history on file.    Family History   Problem Relation Age of Onset    Hyperlipidemia Mother     Diabetes Father          Medications have been verified.        Objective   Pulse 82   Temp (!) 97.2 °F (36.2 °C)   Resp 20   Ht 5' 4.5\" (1.638 m)   Wt 83.5 kg (184 lb)   SpO2 98%   BMI 31.10 kg/m²        Physical Exam     Physical Exam  Vitals and nursing note reviewed.   Constitutional:       General: He is not in acute distress.     Appearance: Normal appearance. He is ill-appearing.   HENT:      Head: Normocephalic.      Right Ear: Tympanic membrane normal.      Left Ear: Tympanic membrane normal.      Nose: Congestion present. No rhinorrhea.      Mouth/Throat:      Mouth: Mucous membranes are moist.      Pharynx: Oropharynx is clear.   Eyes:      Conjunctiva/sclera: Conjunctivae normal.      Pupils: Pupils are equal, round, and reactive to light.   Cardiovascular:      Rate and Rhythm: Normal rate and regular rhythm.      Pulses: Normal pulses.   Pulmonary:      Effort: Pulmonary effort is normal.      Breath sounds: Normal breath sounds.   Abdominal:      Tenderness: There is no abdominal tenderness.   Musculoskeletal:         " General: Normal range of motion.      Cervical back: Normal range of motion and neck supple.   Skin:     General: Skin is warm and dry.      Capillary Refill: Capillary refill takes less than 2 seconds.   Neurological:      Mental Status: He is alert and oriented to person, place, and time.   Psychiatric:         Mood and Affect: Mood normal.         Behavior: Behavior normal.

## 2023-12-24 LAB
FLUAV RNA RESP QL NAA+PROBE: NEGATIVE
FLUBV RNA RESP QL NAA+PROBE: NEGATIVE
SARS-COV-2 RNA RESP QL NAA+PROBE: NEGATIVE

## 2024-01-29 DIAGNOSIS — F41.8 SITUATIONAL ANXIETY: ICD-10-CM

## 2024-01-29 NOTE — TELEPHONE ENCOUNTER
Patient states that the pharmacy cancelled his refills due to him not picking it up in time and need a new script sent over.    Reason for call:   [x] Refill   [] Prior Auth  [] Other:     Office:   [x] PCP/Provider -   [] Specialty/Provider -     Medication: Escitalopram     Dose/Frequency: 10 mg tablet taken by mouth once daily     Quantity: 90    Pharmacy: [facility] [place] [address]     Does the patient have enough for 3 days?   [x] Yes   [] No - Send as HP to POD

## 2024-01-30 RX ORDER — ESCITALOPRAM OXALATE 10 MG/1
10 TABLET ORAL DAILY
Qty: 90 TABLET | Refills: 0 | Status: SHIPPED | OUTPATIENT
Start: 2024-01-30

## 2024-02-19 ENCOUNTER — TELEPHONE (OUTPATIENT)
Age: 29
End: 2024-02-19

## 2024-02-19 NOTE — TELEPHONE ENCOUNTER
Patient called to reschedule their appt and gave the 5/1 and the patient said they would just call back to reschedule.

## 2024-02-21 PROBLEM — Z13.89 SCREENING FOR CARDIOVASCULAR, RESPIRATORY, AND GENITOURINARY DISEASES: Status: RESOLVED | Noted: 2019-05-15 | Resolved: 2024-02-21

## 2024-02-21 PROBLEM — Z13.1 SCREENING FOR DIABETES MELLITUS (DM): Status: RESOLVED | Noted: 2019-05-15 | Resolved: 2024-02-21

## 2024-02-21 PROBLEM — Z13.6 SCREENING FOR CARDIOVASCULAR, RESPIRATORY, AND GENITOURINARY DISEASES: Status: RESOLVED | Noted: 2019-05-15 | Resolved: 2024-02-21

## 2024-02-21 PROBLEM — Z00.00 HEALTHCARE MAINTENANCE: Status: RESOLVED | Noted: 2019-05-15 | Resolved: 2024-02-21

## 2024-02-21 PROBLEM — Z13.83 SCREENING FOR CARDIOVASCULAR, RESPIRATORY, AND GENITOURINARY DISEASES: Status: RESOLVED | Noted: 2019-05-15 | Resolved: 2024-02-21

## 2024-04-23 DIAGNOSIS — F41.8 SITUATIONAL ANXIETY: ICD-10-CM

## 2024-04-25 RX ORDER — ESCITALOPRAM OXALATE 10 MG/1
10 TABLET ORAL DAILY
Qty: 30 TABLET | Refills: 0 | Status: SHIPPED | OUTPATIENT
Start: 2024-04-25

## 2024-05-29 DIAGNOSIS — F41.8 SITUATIONAL ANXIETY: ICD-10-CM

## 2024-05-30 RX ORDER — ESCITALOPRAM OXALATE 10 MG/1
10 TABLET ORAL DAILY
Qty: 30 TABLET | Refills: 1 | Status: SHIPPED | OUTPATIENT
Start: 2024-05-30

## 2024-05-30 NOTE — TELEPHONE ENCOUNTER
Refill must be reviewed and completed by the office or provider. The refill is unable to be approved or denied by the medication management team.    Patient not seen since 3/22/23

## 2024-07-30 DIAGNOSIS — F41.8 SITUATIONAL ANXIETY: ICD-10-CM

## 2024-08-13 RX ORDER — ESCITALOPRAM OXALATE 10 MG/1
10 TABLET ORAL DAILY
Qty: 90 TABLET | Refills: 1 | Status: SHIPPED | OUTPATIENT
Start: 2024-08-13

## 2024-09-03 ENCOUNTER — RA CDI HCC (OUTPATIENT)
Dept: OTHER | Facility: HOSPITAL | Age: 29
End: 2024-09-03

## 2024-09-03 NOTE — PROGRESS NOTES
HCC coding opportunities       Chart reviewed, no opportunity found: CHART REVIEWED, NO OPPORTUNITY FOUND        Patients Insurance        Commercial Insurance: [employer]

## 2024-09-10 ENCOUNTER — OFFICE VISIT (OUTPATIENT)
Dept: FAMILY MEDICINE CLINIC | Facility: CLINIC | Age: 29
End: 2024-09-10
Payer: COMMERCIAL

## 2024-09-10 VITALS
HEART RATE: 74 BPM | TEMPERATURE: 97.9 F | BODY MASS INDEX: 28.28 KG/M2 | RESPIRATION RATE: 20 BRPM | WEIGHT: 176 LBS | HEIGHT: 66 IN | SYSTOLIC BLOOD PRESSURE: 130 MMHG | DIASTOLIC BLOOD PRESSURE: 78 MMHG

## 2024-09-10 DIAGNOSIS — M79.671 FOOT PAIN, RIGHT: ICD-10-CM

## 2024-09-10 DIAGNOSIS — Z13.1 SCREENING FOR DIABETES MELLITUS (DM): ICD-10-CM

## 2024-09-10 DIAGNOSIS — Z00.00 HEALTHCARE MAINTENANCE: Primary | ICD-10-CM

## 2024-09-10 DIAGNOSIS — Z13.220 SCREENING FOR LIPID DISORDERS: ICD-10-CM

## 2024-09-10 PROCEDURE — 99395 PREV VISIT EST AGE 18-39: CPT | Performed by: FAMILY MEDICINE

## 2024-09-10 NOTE — PROGRESS NOTES
"Assessment/Plan:    No problem-specific Assessment & Plan notes found for this encounter.    Cpe  Diet advised  Anxiety stable, could consider wean in future at a good time  Gout? Check uric acid with next labs between attacks     Diagnoses and all orders for this visit:    Healthcare maintenance    Screening for diabetes mellitus (DM)  -     Comprehensive metabolic panel; Future  -     Comprehensive metabolic panel    Screening for lipid disorders  -     Lipid Panel with Direct LDL reflex; Future  -     Lipid Panel with Direct LDL reflex    Foot pain, right  -     Uric acid; Future  -     Uric acid        Return in about 6 months (around 3/10/2025) for Recheck.    Subjective:      Patient ID: Naldo Stiles is a 29 y.o. male.    Chief Complaint   Patient presents with    Physical Exam     CMA         HPI  Life balanced  New job, 100% work from home  happy  Starting to work out  5x/w  Mostly weights  Watches diet  Drinks mostly water  Urine/stool ok  Son 4 months old    The following portions of the patient's history were reviewed and updated as appropriate: allergies, current medications, past family history, past medical history, past social history, past surgical history and problem list.    Review of Systems   Constitutional:  Negative for chills and fever.         Current Outpatient Medications   Medication Sig Dispense Refill    cetirizine (ZyrTEC) 10 mg tablet Take 10 mg by mouth daily as needed       escitalopram (LEXAPRO) 10 mg tablet take 1 tablet by mouth once daily (Patient not taking: Reported on 9/10/2024) 90 tablet 1    indomethacin (INDOCIN) 25 mg capsule 1-2 caps po tid prn gout attacks 60 capsule 1     No current facility-administered medications for this visit.       Objective:    /78   Pulse 74   Temp 97.9 °F (36.6 °C) (Tympanic)   Resp 20   Ht 5' 6\" (1.676 m)   Wt 79.8 kg (176 lb)   BMI 28.41 kg/m²        Physical Exam  Vitals and nursing note reviewed.   Constitutional:       " General: He is not in acute distress.     Appearance: He is well-developed. He is not ill-appearing.   HENT:      Head: Normocephalic.      Right Ear: Tympanic membrane normal.      Left Ear: Tympanic membrane normal.   Eyes:      General: No scleral icterus.     Conjunctiva/sclera: Conjunctivae normal.   Neck:      Vascular: No carotid bruit.   Cardiovascular:      Rate and Rhythm: Normal rate and regular rhythm.      Heart sounds: No murmur heard.  Pulmonary:      Effort: Pulmonary effort is normal. No respiratory distress.      Breath sounds: No wheezing.   Abdominal:      General: There is no distension.      Palpations: Abdomen is soft.      Tenderness: There is no abdominal tenderness.   Genitourinary:     Penis: Normal.       Testes: Normal.   Musculoskeletal:         General: No deformity.      Cervical back: Neck supple.      Right lower leg: No edema.      Left lower leg: No edema.   Skin:     General: Skin is warm and dry.      Coloration: Skin is not jaundiced or pale.   Neurological:      Mental Status: He is alert.      Motor: No weakness.      Gait: Gait normal.   Psychiatric:         Mood and Affect: Mood normal.         Behavior: Behavior normal.         Thought Content: Thought content normal.                Dayton Orta DO

## 2024-10-10 PROBLEM — Z00.00 HEALTHCARE MAINTENANCE: Status: RESOLVED | Noted: 2024-09-10 | Resolved: 2024-10-10

## 2025-02-26 DIAGNOSIS — F41.8 SITUATIONAL ANXIETY: ICD-10-CM

## 2025-02-27 RX ORDER — ESCITALOPRAM OXALATE 10 MG/1
10 TABLET ORAL DAILY
Qty: 90 TABLET | Refills: 1 | Status: SHIPPED | OUTPATIENT
Start: 2025-02-27

## 2025-02-28 NOTE — TELEPHONE ENCOUNTER
Pt called in to see when his last refill was for his medication. Let him know prior to yesterday medication was filled on 12/4/24.  CHRISTINA

## 2025-05-10 ENCOUNTER — OFFICE VISIT (OUTPATIENT)
Dept: URGENT CARE | Facility: CLINIC | Age: 30
End: 2025-05-10
Payer: COMMERCIAL

## 2025-05-10 VITALS
DIASTOLIC BLOOD PRESSURE: 60 MMHG | RESPIRATION RATE: 16 BRPM | SYSTOLIC BLOOD PRESSURE: 140 MMHG | TEMPERATURE: 99.4 F | OXYGEN SATURATION: 98 %

## 2025-05-10 DIAGNOSIS — B96.89 ACUTE BACTERIAL BRONCHITIS: Primary | ICD-10-CM

## 2025-05-10 DIAGNOSIS — J20.8 ACUTE BACTERIAL BRONCHITIS: Primary | ICD-10-CM

## 2025-05-10 PROCEDURE — 99213 OFFICE O/P EST LOW 20 MIN: CPT

## 2025-05-10 RX ORDER — DEXTROMETHORPHAN HYDROBROMIDE AND PROMETHAZINE HYDROCHLORIDE 15; 6.25 MG/5ML; MG/5ML
5 SYRUP ORAL 4 TIMES DAILY PRN
Qty: 118 ML | Refills: 0 | Status: SHIPPED | OUTPATIENT
Start: 2025-05-10

## 2025-05-10 RX ORDER — AZITHROMYCIN 250 MG/1
TABLET, FILM COATED ORAL
Qty: 6 TABLET | Refills: 0 | Status: SHIPPED | OUTPATIENT
Start: 2025-05-10 | End: 2025-05-14

## 2025-05-10 NOTE — PATIENT INSTRUCTIONS
Fever and pain control:  Ibuprofen (Motrin) 600mg every 6 hours for fever, headaches, body aches   Ibuprofen is an NSAID. Please stop medication if you experience stomach/abdominal pain and report to your primary care provider.   Ask your primary care provider before you take NSAIDs if you are on any blood thinners, or if you have a history of heart disease, kidney disease, gastric bypass surgery, GI bleed, or poorly controlled high blood pressure.   May use acetaminophen (Tylenol) as directed on the bottle between doses of ibuprofen. Do not exceed 4,000mg of Tylenol a day.   Cough & Congestion:  Guaifenesin (Mucinex) as directed on the bottle for congestion and mucous-y cough.   Dextromethorphan (Delsym, Robitussin) for dry cough and cough suppression   Pseudoephedrine (Sudafed) for congestion and sinus pressure   Sudafed may cause increased heart rate, irregular heart rate, and an increase in blood pressure. Please do not take Sudafed if you have a history of heart disease or high blood pressure.   Sudafed should not be taken if you are on anti-depressants such as those belonging to the class MAOIs or tricyclics.  Coricidin HBP (chlorpheniramine maleate) can be used as a decongestant in place of other options for those unable to take Sudafed.   Combination cough and cold such as Dimetapp and Mucinex DM also available  Sudafed PE Head Congestion +Flu Severe contains a combination of Sudafed, Tylenol, Mucinex, and Delsym  If prescribed, take Tessalon Pearles or Bromfed/Phenergan DM as directed  Avoid taking prescription cough/congestion medication and OTC options at the same time  Sore Throat:  Cepacol lozenges  Chloraseptic spray  Throat Coat tea  Warm salt water gargles   Vitamin/Minerals:  Vitamin D3 2,000 IU daily  Vitamin C 1000mg twice a day  Some studies suggest that Zinc 12.5-15mg every 2 hours while awake for 5 days may shorten symptom duration by 1-2 days  Other:   Plenty of fluids and rest  Cool mist  humidifiers  Nasal sinus rinses such as NettiPot, Neimed, or Navage can be used to help flush out sinuses  Please only use distilled/sterile water that can be purchased at your local pharmacy  Nasal spray options:  Nasal steroid sprays such as Flonase, Nasonex, Nasacort may help with sinus congestion, itchy/watery eyes, clogged ears  These options must be used consistently for at least 2 weeks for full effect  Afrin nasal spray for quick acting congestion relief  Saline nasal spray for dry nose, irritation of the nasal passages  Follow up with PCP in 3-5 days  Proceed to the ED if symptoms worsen

## 2025-05-10 NOTE — PROGRESS NOTES
[facility]        NAME: Naldo Stiles is a 30 y.o. male  : 1995    MRN: [id number]  DATE: May 10, 2025  TIME: 11:26 AM    Assessment and Plan   Acute bacterial bronchitis [J20.8, B96.89]  1. Acute bacterial bronchitis  azithromycin (ZITHROMAX) 250 mg tablet    promethazine-dextromethorphan (PHENERGAN-DM) 6.25-15 mg/5 mL oral syrup        Coughing x week, was improving now worsening with subjective fevers. Will treat.     Patient Instructions       Fever and pain control:  Ibuprofen (Motrin) 600mg every 6 hours for fever, headaches, body aches   Ibuprofen is an NSAID. Please stop medication if you experience stomach/abdominal pain and report to your primary care provider.   Ask your primary care provider before you take NSAIDs if you are on any blood thinners, or if you have a history of heart disease, kidney disease, gastric bypass surgery, GI bleed, or poorly controlled high blood pressure.   May use acetaminophen (Tylenol) as directed on the bottle between doses of ibuprofen. Do not exceed 4,000mg of Tylenol a day.   Cough & Congestion:  Guaifenesin (Mucinex) as directed on the bottle for congestion and mucous-y cough.   Dextromethorphan (Delsym, Robitussin) for dry cough and cough suppression   Pseudoephedrine (Sudafed) for congestion and sinus pressure   Sudafed may cause increased heart rate, irregular heart rate, and an increase in blood pressure. Please do not take Sudafed if you have a history of heart disease or high blood pressure.   Sudafed should not be taken if you are on anti-depressants such as those belonging to the class MAOIs or tricyclics.  Coricidin HBP (chlorpheniramine maleate) can be used as a decongestant in place of other options for those unable to take Sudafed.   Combination cough and cold such as Dimetapp and Mucinex DM also available  Sudafed PE Head Congestion +Flu Severe contains a combination of Sudafed, Tylenol, Mucinex, and Delsym  If prescribed, take  Tessalon Pearles or Bromfed/Phenergan DM as directed  Avoid taking prescription cough/congestion medication and OTC options at the same time  Sore Throat:  Cepacol lozenges  Chloraseptic spray  Throat Coat tea  Warm salt water gargles   Vitamin/Minerals:  Vitamin D3 2,000 IU daily  Vitamin C 1000mg twice a day  Some studies suggest that Zinc 12.5-15mg every 2 hours while awake for 5 days may shorten symptom duration by 1-2 days  Other:   Plenty of fluids and rest  Cool mist humidifiers  Nasal sinus rinses such as NettiPot, Neimed, or Navage can be used to help flush out sinuses  Please only use distilled/sterile water that can be purchased at your local pharmacy  Nasal spray options:  Nasal steroid sprays such as Flonase, Nasonex, Nasacort may help with sinus congestion, itchy/watery eyes, clogged ears  These options must be used consistently for at least 2 weeks for full effect  Afrin nasal spray for quick acting congestion relief  Saline nasal spray for dry nose, irritation of the nasal passages  Follow up with PCP in 3-5 days  Proceed to the ED if symptoms worsen      If tests are performed, our office will contact you with results only if changes need to made to the care plan discussed with you at the visit. You can review your full results on [facility] Mychart.    Chief Complaint     Chief Complaint   Patient presents with    Cold Like Symptoms     Started with 2 weeks ago with cough / cold which did get some what better, cough remained. Now reports cough worsen this week. Has not checked tempature. Chill and sweats reported over the last 2 nights. Last night symptoms were worse. Taking otc cough Supressant. Also taking otc Claritin.          History of Present Illness       Cough  This is a new problem. The current episode started 1 to 4 weeks ago. Progression since onset: was improving, now worsening again. The problem occurs every few minutes. The cough is Productive of sputum. Associated symptoms include  chills. Pertinent negatives include no chest pain, fever, headaches, myalgias, postnasal drip, rhinorrhea, sore throat, shortness of breath or wheezing. He has tried OTC cough suppressant (Claritin) for the symptoms. The treatment provided mild relief.       Review of Systems   Review of Systems   Constitutional:  Positive for chills and diaphoresis. Negative for fever.   HENT:  Negative for congestion, postnasal drip, rhinorrhea, sinus pressure, sore throat and trouble swallowing.    Respiratory:  Positive for cough and chest tightness. Negative for shortness of breath and wheezing.    Cardiovascular:  Negative for chest pain and palpitations.   Gastrointestinal:  Negative for abdominal pain, nausea and vomiting.   Genitourinary:  Negative for difficulty urinating.   Musculoskeletal:  Negative for myalgias.   Neurological:  Negative for dizziness and headaches.         Current Medications       Current Outpatient Medications:     azithromycin (ZITHROMAX) 250 mg tablet, Take 2 tablets today then 1 tablet daily x 4 days, Disp: 6 tablet, Rfl: 0    promethazine-dextromethorphan (PHENERGAN-DM) 6.25-15 mg/5 mL oral syrup, Take 5 mL by mouth 4 (four) times a day as needed for cough, Disp: 118 mL, Rfl: 0    cetirizine (ZyrTEC) 10 mg tablet, Take 10 mg by mouth daily as needed  (Patient not taking: Reported on 5/10/2025), Disp: , Rfl:     escitalopram (LEXAPRO) 10 mg tablet, take 1 tablet by mouth once daily, Disp: 90 tablet, Rfl: 1    indomethacin (INDOCIN) 25 mg capsule, 1-2 caps po tid prn gout attacks, Disp: 60 capsule, Rfl: 1    Current Allergies     Allergies as of 05/10/2025 - Reviewed 05/10/2025   Allergen Reaction Noted    Other  05/15/2019            The following portions of the patient's history were reviewed and updated as appropriate: allergies, current medications, past family history, past medical history, past social history, past surgical history and problem list.     Past Medical History:   Diagnosis Date     Allergic     Anxiety        No past surgical history on file.    Family History   Problem Relation Age of Onset    Hyperlipidemia Mother     Diabetes Father          Medications have been verified.        Objective   /60   Temp 99.4 °F (37.4 °C) (Tympanic)   Resp 16   SpO2 98%        Physical Exam     Physical Exam  Constitutional:       General: He is not in acute distress.     Appearance: He is not ill-appearing.   HENT:      Nose: Nose normal.      Mouth/Throat:      Mouth: Mucous membranes are moist.      Pharynx: Oropharynx is clear.   Eyes:      Pupils: Pupils are equal, round, and reactive to light.   Cardiovascular:      Rate and Rhythm: Normal rate and regular rhythm.      Pulses: Normal pulses.      Heart sounds: Normal heart sounds. No murmur heard.     No gallop.   Pulmonary:      Effort: Pulmonary effort is normal. No respiratory distress.      Breath sounds: Normal breath sounds. No stridor. No wheezing, rhonchi or rales.   Abdominal:      General: Abdomen is flat. Bowel sounds are normal. There is no distension.      Palpations: Abdomen is soft.      Tenderness: There is no abdominal tenderness.   Musculoskeletal:         General: Normal range of motion.      Cervical back: Normal range of motion.   Skin:     General: Skin is warm and dry.      Capillary Refill: Capillary refill takes less than 2 seconds.   Neurological:      Mental Status: He is alert and oriented to person, place, and time.